# Patient Record
Sex: FEMALE
[De-identification: names, ages, dates, MRNs, and addresses within clinical notes are randomized per-mention and may not be internally consistent; named-entity substitution may affect disease eponyms.]

---

## 2017-10-27 ENCOUNTER — CHARTING TRANS (OUTPATIENT)
Dept: OTHER | Age: 37
End: 2017-10-27

## 2017-10-27 ENCOUNTER — LAB SERVICES (OUTPATIENT)
Dept: OTHER | Age: 37
End: 2017-10-27

## 2017-10-27 LAB
APPEARANCE: NORMAL
BILIRUBIN: NEGATIVE
COLOR: NORMAL
GLUCOSE U: NEGATIVE
KETONES: NEGATIVE
LEUKOCYTES: NORMAL
NITRITE: NEGATIVE
OCCULT BLOOD: NEGATIVE
PH: 5
PROTEIN: NEGATIVE
URINE SPEC GRAVITY: 1.01
UROBILINOGEN: 0.2

## 2018-11-02 VITALS
SYSTOLIC BLOOD PRESSURE: 102 MMHG | OXYGEN SATURATION: 96 % | HEART RATE: 92 BPM | RESPIRATION RATE: 16 BRPM | BODY MASS INDEX: 22.36 KG/M2 | WEIGHT: 156.2 LBS | HEIGHT: 70 IN | TEMPERATURE: 98.7 F | DIASTOLIC BLOOD PRESSURE: 66 MMHG

## 2020-10-19 ENCOUNTER — OFFICE VISIT (OUTPATIENT)
Dept: OPHTHALMOLOGY | Age: 40
End: 2020-10-19

## 2020-10-19 DIAGNOSIS — H52.223 REGULAR ASTIGMATISM OF BOTH EYES: Primary | ICD-10-CM

## 2020-10-19 RX ORDER — MEDROXYPROGESTERONE ACETATE 150 MG/ML
INJECTION, SUSPENSION INTRAMUSCULAR
COMMUNITY
Start: 2020-05-04

## 2021-03-15 ENCOUNTER — OFFICE VISIT (OUTPATIENT)
Dept: OPHTHALMOLOGY | Age: 41
End: 2021-03-15

## 2021-03-15 DIAGNOSIS — H52.223 REGULAR ASTIGMATISM OF BOTH EYES: Primary | ICD-10-CM

## 2021-03-15 RX ORDER — CYCLOSPORINE 0.5 MG/ML
1 EMULSION OPHTHALMIC 2 TIMES DAILY
COMMUNITY
Start: 2021-03-15 | End: 2021-03-15 | Stop reason: CLARIF

## 2021-03-15 RX ORDER — CYCLOSPORINE 0.5 MG/ML
1 EMULSION OPHTHALMIC 2 TIMES DAILY
Qty: 1 BOTTLE | Refills: 1 | Status: SHIPPED | OUTPATIENT
Start: 2021-03-15 | End: 2022-03-15

## 2023-02-02 ENCOUNTER — OFFICE VISIT (OUTPATIENT)
Dept: GYNECOLOGY | Age: 43
End: 2023-02-02
Payer: COMMERCIAL

## 2023-02-02 VITALS
HEIGHT: 70 IN | DIASTOLIC BLOOD PRESSURE: 80 MMHG | SYSTOLIC BLOOD PRESSURE: 110 MMHG | WEIGHT: 157 LBS | BODY MASS INDEX: 22.48 KG/M2

## 2023-02-02 DIAGNOSIS — N80.129 ENDOMETRIOMA: ICD-10-CM

## 2023-02-02 DIAGNOSIS — Z12.4 ENCOUNTER FOR PAPANICOLAOU SMEAR FOR CERVICAL CANCER SCREENING: ICD-10-CM

## 2023-02-02 DIAGNOSIS — D21.9 FIBROIDS: Primary | ICD-10-CM

## 2023-02-02 PROCEDURE — 99203 OFFICE O/P NEW LOW 30 MIN: CPT | Performed by: OBSTETRICS & GYNECOLOGY

## 2023-02-02 PROCEDURE — 76830 TRANSVAGINAL US NON-OB: CPT | Performed by: OBSTETRICS & GYNECOLOGY

## 2023-02-02 RX ORDER — LISDEXAMFETAMINE DIMESYLATE 20 MG/1
CAPSULE ORAL
COMMUNITY
Start: 2023-01-03

## 2023-02-02 RX ORDER — CYCLOSPORINE 0.5 MG/ML
EMULSION OPHTHALMIC
COMMUNITY
Start: 2023-01-26

## 2023-02-02 RX ORDER — FLUTICASONE PROPIONATE 50 MCG
2 SPRAY, SUSPENSION (ML) NASAL DAILY
COMMUNITY
Start: 2018-10-23

## 2023-02-02 RX ORDER — FLUOXETINE HYDROCHLORIDE 20 MG/1
CAPSULE ORAL
COMMUNITY
Start: 2022-12-29

## 2023-02-02 RX ORDER — FLUOCINOLONE ACETONIDE 0.1 MG/ML
1 SOLUTION TOPICAL
COMMUNITY
Start: 2018-01-23

## 2023-02-02 RX ORDER — DIAZEPAM 10 MG/1
TABLET ORAL
COMMUNITY
Start: 2022-12-29

## 2023-02-02 RX ORDER — RISANKIZUMAB-RZAA 150 MG/ML
150 INJECTION SUBCUTANEOUS
COMMUNITY

## 2023-02-02 NOTE — PROGRESS NOTES
FIDENCIO Rivers is a 36 y.o. female seen for uterine fibroids. She had a myomectomy in 2019. She had eggs frozen for future pregnancy. She also has hx of endometriosis. She is interested in proceeding with discussion about possible pregnancy. Past Medical History, Past Surgical History, Family history, Social History, and Medications were all reviewed with the patient today and updated as necessary. Current Outpatient Medications   Medication Sig    RESTASIS 0.05 % ophthalmic emulsion INSTILL 1 DROP TWICE A DAY INTO BOTH EYES    diazePAM (VALIUM) 10 MG tablet TAKE 1 TABLET (10 MG) BY MOUTH DAILY. doxyLAMINE succinate (GNP SLEEP AID) 25 MG tablet Take 25 mg by mouth daily    fluocinolone acetonide (SYNALAR) 0.01 % external solution Apply 1 application topically    FLUoxetine (PROZAC) 20 MG capsule TAKE 1 CAPSULE (20 MG) DAILY    fluticasone (FLONASE) 50 MCG/ACT nasal spray 2 sprays by Nasal route daily    VYVANSE 20 MG CAPS TAKE 1 CAPSULE (20 MG) 2 (TWO) TIMES A DAY MAX DAILY AMOUNT: 40 MG    Risankizumab-rzaa (SKYRIZI PEN) 150 MG/ML SOAJ Inject 150 mg into the skin     No current facility-administered medications for this visit.      Allergies   Allergen Reactions    Ciprofloxacin Itching     Tendonitis-psoriatic arthritis- bilateral feet and knees    Sulfamethoxazole-Trimethoprim Hives    Nitrofurantoin Rash     Past Medical History:   Diagnosis Date    ADD (attention deficit disorder)     Endometriosis     Fatigue     Fibroids     Psoriatic arthritis ([facility] [address]      Past Surgical History:   Procedure Laterality Date    BREAST BIOPSY Right     Benign per pt    MYOMECTOMY  06/2019    Very large open myomectomy    SALPINGECTOMY Left     Pt had eggs frozen    SINUS SURGERY  12/2018     Family History   Problem Relation Age of Onset    Colon Polyps Mother     No Known Problems Father       Social History     Tobacco Use    Smoking status: Former     Types: Cigarettes    Smokeless tobacco: Never   Substance Use Topics    Alcohol use: Yes     Comment: Occ       Social History     Substance and Sexual Activity   Sexual Activity Yes    Birth control/protection: None     OB History    Para Term  AB Living   1 0 0 0 1 0   SAB IAB Ectopic Molar Multiple Live Births   0 0 0 0 0 0      # Outcome Date GA Lbr Gio/2nd Weight Sex Delivery Anes PTL Lv   1 AB                Health Maintenance  Mammogram:   Colonoscopy:   Bone Density:  Pap Smear 2022 per pt     ROS:    Review of Systems  General: Not Present- Chills, Fever, Fatigue, Insomnia, Hot flashes/Night sweats, Weight gain  Skin: Not Present- Bruising, Change in Wart/Mole, Excessive Sweating, Itching, Nail Changes, New Lesions, Rash, Skin Color Changes and Ulcer. HEENT: Not Present- Headache, Blurred Vision, Double Vision, Glaucoma, Visual Disturbances, Hearing Loss, Ringing in the Ears, Vertigo, Nose Bleed, Bleeding Gums, Hoarseness and Sore Throat. Neck: Not Present- Neck Pain and Neck Swelling. Respiratory: Not Present- Cough, Difficulty Breathing and Difficulty Breathing on Exertion. Breast: Not Present- Breast Mass, Breast Pain, Breast Swelling, Nipple Discharge, Nipple Pain, Recent Breast Size Changes and Skin Changes. Cardiovascular: Not Present- Abnormal Blood Pressure, Chest Pain, Edema, Fainting / Blacking Out, Palpitations, Shortness of Breath and Swelling of Extremities. Gastrointestinal: Not Present- Abdominal Pain, Abdominal Swelling, Bloating, Change in Bowel Habits, Constipation, Diarrhea, Difficulty Swallowing, Gets full quickly at meals, Nausea, Rectal Bleeding and Vomiting. Female Genitourinary: Not Present- Dysmenorrhea, Dyspareunia, Decreased libido, Excessive Menstrual Bleeding, Menstrual Irregularities, Pelvic Pain, Urinary Complaints, Vaginal Discharge, Vaginal itching/burning, Vaginal odor  Musculoskeletal: Not Present- Joint Pain and Muscle Pain.   Neurological: Not Present- Dizziness, Fainting, Headaches and Seizures. Psychiatric: Not Present- Anxiety, Depression, Mood changes and Panic Attacks. Endocrine: Not Present- Appetite Changes, Cold Intolerance, Excessive Thirst, Excessive Urination and Heat Intolerance. Hematology: Not Present- Abnormal Bleeding, Easy Bruising and Enlarged Lymph Nodes. PHYSICAL EXAM:    /80   Ht 5' 10\" (1.778 m)   Wt 157 lb (71.2 kg)   LMP 01/25/2023   BMI 22.53 kg/m²     Physical Exam   General   Mental Status - Alert. General Appearance - Cooperative. Abdomen   Inspection: - Inspection Normal.   Palpation/Percussion: Palpation and Percussion of the abdomen reveal - Non Tender, No Rebound tenderness, No Rigidity (guarding), No hepatosplenomegaly, No Palpable abdominal masses and Soft. Auscultation: Auscultation of the abdomen reveals - Bowel sounds normal.     Female Genitourinary     External Genitalia   Vulva: - Normal. Perineum - Normal. Bartholin's Gland - Bilateral - Normal. Clitoris - Normal.   Introitus: Characteristics - Normal.   Urethra: Characteristics - Normal.     Speculum & Bimanual   Vagina: Vaginal Mucosa - Normal.   Vaginal Wall: - Normal.   Vaginal Lesions - None. Cervix: Characteristics - Normal.   Uterus: Characteristics - Normal.   Adnexa: - Normal.   Bladder - Normal.     Lymphatics  No cervical, axillary or groin adenopathy          Medical problems and test results were reviewed with the patient today. ASSESSMENT and PLAN    1. Fibroids  -     US NON OB TRANSVAGINAL  2. Endometrioma  3. Encounter for Papanicolaou smear for cervical cancer screening  -     PAP LB, Reflex HPV ASCUS     Comment   Gyn Report [facility] Page [facility]   Name: Gerard Strange Patient ID: [id number]   Comment   [id number]----[id number]   HISTORY: History of open myomectomy and known endometriosis. Fibroid check.    COMPARISON: None available   --------------------------------------------------------------------------------------------------------------- Enlarged uterus = 9wks with multiple small fibroids. These are subserosal and intramural and measure   2.2/.9/1.6cm, 1.8/2.0/1.9cm, 2.1/2.8/2.7cm, and 2.5/1.9/2.3cm. Endometrium = 9.9mm   Rt ovary has simple follicle that measures 1.5/5.2/7.2QO   Lt ovary has complex debris filled cyst that measures 5.8/5.1/5.3cm and appears to be an endometrioma. Normal   blood flow noted to the ovary. No free fluid noted in the pelvis. Date: 02/02/2023 Perf. Physician: Dr. Azar MD Sonographer: Tegan Croft, [address] [place] [address] done in my office and discussed with patient. Recommend referral to [facility] for further evaluation regarding pregnancy      Time:  I spent  30 minutes in preparing to see patient (including chart review and preparation), obtaining and/or reviewing additional medical history, performing a physical exam and evaluation, documenting clinical information in the electronic health record, independently interpreting results, communicating results to patient, family or caregiver, and/or coordinating care. No follow-ups on file.        Cha Reyes MD

## 2023-02-03 ENCOUNTER — HOSPITAL ENCOUNTER (OUTPATIENT)
Dept: GENERAL RADIOLOGY | Age: 43
Discharge: HOME | End: 2023-02-03
Payer: COMMERCIAL

## 2023-02-03 DIAGNOSIS — M25.571 BILATERAL ANKLE PAIN, UNSPECIFIED CHRONICITY: ICD-10-CM

## 2023-02-03 DIAGNOSIS — M25.572 BILATERAL ANKLE PAIN, UNSPECIFIED CHRONICITY: ICD-10-CM

## 2023-02-03 PROCEDURE — 73620 X-RAY EXAM OF FOOT: CPT

## 2023-02-03 PROCEDURE — 73600 X-RAY EXAM OF ANKLE: CPT

## 2023-12-11 ENCOUNTER — OFFICE VISIT (OUTPATIENT)
Dept: OBGYN CLINIC | Age: 43
End: 2023-12-11
Payer: COMMERCIAL

## 2023-12-11 VITALS — SYSTOLIC BLOOD PRESSURE: 102 MMHG | DIASTOLIC BLOOD PRESSURE: 70 MMHG | BODY MASS INDEX: 23.68 KG/M2 | WEIGHT: 165 LBS

## 2023-12-11 DIAGNOSIS — N80.129 ENDOMETRIOMA OF OVARY: ICD-10-CM

## 2023-12-11 DIAGNOSIS — D21.9 FIBROIDS: ICD-10-CM

## 2023-12-11 DIAGNOSIS — N80.03 ADENOMYOSIS: ICD-10-CM

## 2023-12-11 DIAGNOSIS — R10.2 PELVIC PAIN: ICD-10-CM

## 2023-12-11 DIAGNOSIS — N94.6 DYSMENORRHEA: Primary | ICD-10-CM

## 2023-12-11 DIAGNOSIS — N83.6 HEMATOSALPINX: ICD-10-CM

## 2023-12-11 PROCEDURE — 99214 OFFICE O/P EST MOD 30 MIN: CPT | Performed by: OBSTETRICS & GYNECOLOGY

## 2023-12-11 PROCEDURE — 76830 TRANSVAGINAL US NON-OB: CPT | Performed by: OBSTETRICS & GYNECOLOGY

## 2023-12-11 RX ORDER — CELECOXIB 100 MG/1
CAPSULE ORAL
COMMUNITY
Start: 2023-06-25

## 2023-12-11 RX ORDER — CERTOLIZUMAB PEGOL 200 MG/ML
INJECTION, SOLUTION SUBCUTANEOUS
COMMUNITY
Start: 2023-11-27

## 2023-12-11 NOTE — PROGRESS NOTES
Nitrofurantoin Rash     Past Medical History:   Diagnosis Date    ADD (attention deficit disorder)     Endometriosis     Fatigue     Fibroids     Psoriatic arthritis ([address])     Rheumatoid arthritis ([address])      Past Surgical History:   Procedure Laterality Date    BREAST BIOPSY Right     Benign per pt    EYE SURGERY Bilateral     [address]  Also reviion of left eye in Oct 2021    [address] [facility]  2019    Very large open myomectomy    SALPINGECTOMY Left     Pt had eggs frozen    SINUS SURGERY  2018     Family History   Problem Relation Age of Onset    Colon Polyps Mother     No Known Problems Father       Social History     Tobacco Use    Smoking status: Former     Types: Cigarettes    Smokeless tobacco: Never   Substance Use Topics    Alcohol use: Yes     Comment: Occ       Social History     Substance and Sexual Activity   Sexual Activity Yes    Birth control/protection: None     OB History    Para Term  AB Living   1 0 0 0 1 0   SAB IAB Ectopic Molar Multiple Live Births   0 0 0 0 0 0      # Outcome Date GA Lbr Gio/2nd Weight Sex Delivery Anes PTL Lv   1 AB                Health Maintenance  Pap smear 2023 Neg  ROS:    Review of Systems  General: Not Present- Chills, Fever, Fatigue, Insomnia, Hot flashes/Night sweats, Weight gain  Skin: Not Present- Bruising, Change in Wart/Mole, Excessive Sweating, Itching, Nail Changes, New Lesions, Rash, Skin Color Changes and Ulcer. HEENT: Not Present- Headache, Blurred Vision, Double Vision, Glaucoma, Visual Disturbances, Hearing Loss, Ringing in the Ears, Vertigo, Nose Bleed, Bleeding Gums, Hoarseness and Sore Throat. Neck: Not Present- Neck Pain and Neck Swelling. Respiratory: Not Present- Cough, Difficulty Breathing and Difficulty Breathing on Exertion. Breast: Not Present- Breast Mass, Breast Pain, Breast Swelling, Nipple Discharge, Nipple Pain, Recent Breast Size Changes and Skin Changes.   Cardiovascular: Not Present- Abnormal Blood Pressure, Chest

## 2024-01-08 ENCOUNTER — OFFICE VISIT (OUTPATIENT)
Dept: OBGYN CLINIC | Age: 44
End: 2024-01-08
Payer: COMMERCIAL

## 2024-01-08 VITALS — HEIGHT: 70 IN | BODY MASS INDEX: 24.48 KG/M2 | WEIGHT: 171 LBS

## 2024-01-08 DIAGNOSIS — N80.129 ENDOMETRIOMA OF OVARY: ICD-10-CM

## 2024-01-08 DIAGNOSIS — N83.6 HEMATOSALPINX: ICD-10-CM

## 2024-01-08 DIAGNOSIS — D21.9 FIBROIDS: ICD-10-CM

## 2024-01-08 DIAGNOSIS — N80.03 ADENOMYOSIS: ICD-10-CM

## 2024-01-08 DIAGNOSIS — N94.6 DYSMENORRHEA: ICD-10-CM

## 2024-01-08 DIAGNOSIS — R10.2 PELVIC PAIN: Primary | ICD-10-CM

## 2024-01-08 PROCEDURE — 99214 OFFICE O/P EST MOD 30 MIN: CPT | Performed by: OBSTETRICS & GYNECOLOGY

## 2024-01-08 NOTE — PROGRESS NOTES
AMOUNT: 40 MG, Disp: , Rfl:      Family history is significant for family history includes Colon Polyps in her mother; No Known Problems in her father..       PHYSICAL EXAM:  Height 1.778 m (5' 10\"), weight 77.6 kg (171 lb), last menstrual period 12/22/2023. OBGyn Exam   The patient appears well, alert, oriented x 3, in no distress.    ASSESSMENT:  Encounter Diagnoses   Name Primary?    Pelvic pain Yes    Dysmenorrhea     Fibroids     Hematosalpinx     Endometrioma of ovary     Adenomyosis        PLAN:  Extensive discussion with the patient about endometriosis and rationales for surgery.  She is very hesitant to have just a hysterectomy and is more interested in excision of endometriosis/endometriosis surgery.  Discussed the differences with her and endometrial surgery may or may not require hysterectomy and bilateral salpingo-oophorectomy.  She is interested in whether there is a \"complex gynecology\" practice within the area as she feels she had seen a surgeon in [place] who had this type of practice and feels comfortable with that type of practice doing her surgery.  Discussed endometriosis centers and rationale for patient is being seen there.  After thorough discussion with this she would like information about regional endometriosis centers she was given information on this she was also given information on the [employer] website.  She would like to look into regional or national centers.  Discussed we be happy to work with her as possible.  All of her questions were answered.  Extensive discussion was had with her in approximately 35 minutes was spent with her and charting.  No orders of the defined types were placed in this encounter.

## 2024-06-21 LAB
BACTERIA SPEC CULT: ABNORMAL
BACTERIA SPEC CULT: NORMAL
GRAM STN SPEC: ABNORMAL
GRAM STN SPEC: ABNORMAL
SERVICE CMNT-IMP: ABNORMAL
SERVICE CMNT-IMP: NORMAL

## 2024-06-24 LAB
BACTERIA SPEC CULT: NORMAL
SERVICE CMNT-IMP: NORMAL

## 2024-09-18 ENCOUNTER — HOSPITAL ENCOUNTER (OUTPATIENT)
Dept: GENERAL RADIOLOGY | Age: 44
Discharge: HOME | End: 2024-09-21
Payer: COMMERCIAL

## 2024-09-18 DIAGNOSIS — M79.641 BILATERAL HAND PAIN: ICD-10-CM

## 2024-09-18 DIAGNOSIS — M79.642 BILATERAL HAND PAIN: ICD-10-CM

## 2024-09-18 PROCEDURE — 73120 X-RAY EXAM OF HAND: CPT
